# Patient Record
Sex: FEMALE | URBAN - METROPOLITAN AREA
[De-identification: names, ages, dates, MRNs, and addresses within clinical notes are randomized per-mention and may not be internally consistent; named-entity substitution may affect disease eponyms.]

---

## 2021-01-06 ENCOUNTER — OFFICE VISIT (OUTPATIENT)
Dept: URBAN - METROPOLITAN AREA CLINIC 88 | Facility: CLINIC | Age: 36
End: 2021-01-06
Payer: COMMERCIAL

## 2021-01-06 DIAGNOSIS — H52.223 REGULAR ASTIGMATISM, BILATERAL: Primary | Chronic | ICD-10-CM

## 2021-01-06 DIAGNOSIS — H16.423 PANNUS (CORNEAL), BILATERAL: Chronic | ICD-10-CM

## 2021-01-06 PROCEDURE — 92004 COMPRE OPH EXAM NEW PT 1/>: CPT | Performed by: OPTOMETRIST

## 2021-01-06 ASSESSMENT — KERATOMETRY: OS: 44.63

## 2021-01-06 ASSESSMENT — INTRAOCULAR PRESSURE
OS: 17
OD: 17

## 2021-01-06 ASSESSMENT — VISUAL ACUITY
OS: 20/40
OD: 20/80

## 2021-01-06 NOTE — IMPRESSION/PLAN
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 Plan: Discussed status in detail with patient. 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 Because of this, recommend referral to corneal specialist for evaluation and potential work-up to determine underlying cause, prognosis and potential treatment options. Will also share these notes with PCP to inform of this condition.   Pt stated she may have difficulty with transportation, but would like to see the specialist. Will monitor for progression in 6 months if patient is for some reason unable to follow through with referral.

## 2021-01-06 NOTE — IMPRESSION/PLAN
Impression: Regular astigmatism, bilateral: H52.223. Plan: Reviewed refractive prescription in detail with patient and need for glasses to improve vision at this time. Discussed lens options and designs. Ed on time it may take to adapt to lenses with high astigmatism and aniso. Ok to release spectacle prescription.